# Patient Record
Sex: FEMALE | URBAN - METROPOLITAN AREA
[De-identification: names, ages, dates, MRNs, and addresses within clinical notes are randomized per-mention and may not be internally consistent; named-entity substitution may affect disease eponyms.]

---

## 2020-04-20 ENCOUNTER — TELEPHONE (OUTPATIENT)
Dept: CARDIOLOGY CLINIC | Age: 75
End: 2020-04-20

## 2020-04-20 NOTE — TELEPHONE ENCOUNTER
Patient called stating that patient was taking Repatha and is having a reaction from it. She is stating that she is having itching all over the body no redness per nurse, weakness, when she urinates it itches. Please Advise.